# Patient Record
Sex: FEMALE | Race: WHITE | NOT HISPANIC OR LATINO | Employment: FULL TIME | URBAN - METROPOLITAN AREA
[De-identification: names, ages, dates, MRNs, and addresses within clinical notes are randomized per-mention and may not be internally consistent; named-entity substitution may affect disease eponyms.]

---

## 2020-10-03 ENCOUNTER — FLU SHOT (OUTPATIENT)
Dept: FAMILY MEDICINE CLINIC | Facility: CLINIC | Age: 40
End: 2020-10-03

## 2020-10-03 DIAGNOSIS — Z23 NEED FOR INFLUENZA VACCINATION: ICD-10-CM

## 2020-10-03 PROCEDURE — 90471 IMMUNIZATION ADMIN: CPT | Performed by: FAMILY MEDICINE

## 2020-10-03 PROCEDURE — 90686 IIV4 VACC NO PRSV 0.5 ML IM: CPT | Performed by: FAMILY MEDICINE

## 2020-11-17 ENCOUNTER — OFFICE VISIT (OUTPATIENT)
Dept: FAMILY MEDICINE CLINIC | Facility: CLINIC | Age: 40
End: 2020-11-17

## 2020-11-17 VITALS
HEIGHT: 62 IN | WEIGHT: 128 LBS | BODY MASS INDEX: 23.55 KG/M2 | DIASTOLIC BLOOD PRESSURE: 70 MMHG | TEMPERATURE: 97.5 F | OXYGEN SATURATION: 99 % | SYSTOLIC BLOOD PRESSURE: 110 MMHG | HEART RATE: 85 BPM

## 2020-11-17 DIAGNOSIS — Z13.29 SCREENING FOR THYROID DISORDER: ICD-10-CM

## 2020-11-17 DIAGNOSIS — Z13.220 SCREENING FOR LIPID DISORDERS: ICD-10-CM

## 2020-11-17 DIAGNOSIS — Z13.0 SCREENING, ANEMIA, DEFICIENCY, IRON: ICD-10-CM

## 2020-11-17 DIAGNOSIS — Z13.1 SCREENING FOR DIABETES MELLITUS: ICD-10-CM

## 2020-11-17 DIAGNOSIS — Z00.00 HEALTH MAINTENANCE EXAMINATION: Primary | ICD-10-CM

## 2020-11-17 LAB
ALBUMIN SERPL-MCNC: 4.6 G/DL (ref 3.5–5.2)
ALBUMIN/GLOB SERPL: 2.2 G/DL
ALP SERPL-CCNC: 41 U/L (ref 39–117)
ALT SERPL-CCNC: 33 U/L (ref 1–33)
AST SERPL-CCNC: 32 U/L (ref 1–32)
BASOPHILS # BLD AUTO: 0.02 10*3/MM3 (ref 0–0.2)
BASOPHILS NFR BLD AUTO: 0.4 % (ref 0–1.5)
BILIRUB SERPL-MCNC: 0.4 MG/DL (ref 0–1.2)
BUN SERPL-MCNC: 14 MG/DL (ref 6–20)
BUN/CREAT SERPL: 20.9 (ref 7–25)
CALCIUM SERPL-MCNC: 8.8 MG/DL (ref 8.6–10.5)
CHLORIDE SERPL-SCNC: 104 MMOL/L (ref 98–107)
CHOLEST SERPL-MCNC: 150 MG/DL (ref 0–200)
CO2 SERPL-SCNC: 27.8 MMOL/L (ref 22–29)
CREAT SERPL-MCNC: 0.67 MG/DL (ref 0.57–1)
EOSINOPHIL # BLD AUTO: 0.11 10*3/MM3 (ref 0–0.4)
EOSINOPHIL NFR BLD AUTO: 2.1 % (ref 0.3–6.2)
ERYTHROCYTE [DISTWIDTH] IN BLOOD BY AUTOMATED COUNT: 11.8 % (ref 12.3–15.4)
GLOBULIN SER CALC-MCNC: 2.1 GM/DL
GLUCOSE SERPL-MCNC: 85 MG/DL (ref 65–99)
HCT VFR BLD AUTO: 41.1 % (ref 34–46.6)
HDLC SERPL-MCNC: 62 MG/DL (ref 40–60)
HGB BLD-MCNC: 13.6 G/DL (ref 12–15.9)
IMM GRANULOCYTES # BLD AUTO: 0.01 10*3/MM3 (ref 0–0.05)
IMM GRANULOCYTES NFR BLD AUTO: 0.2 % (ref 0–0.5)
LDLC SERPL CALC-MCNC: 75 MG/DL (ref 0–100)
LYMPHOCYTES # BLD AUTO: 1.83 10*3/MM3 (ref 0.7–3.1)
LYMPHOCYTES NFR BLD AUTO: 35.6 % (ref 19.6–45.3)
MCH RBC QN AUTO: 33.3 PG (ref 26.6–33)
MCHC RBC AUTO-ENTMCNC: 33.1 G/DL (ref 31.5–35.7)
MCV RBC AUTO: 100.7 FL (ref 79–97)
MONOCYTES # BLD AUTO: 0.45 10*3/MM3 (ref 0.1–0.9)
MONOCYTES NFR BLD AUTO: 8.8 % (ref 5–12)
NEUTROPHILS # BLD AUTO: 2.72 10*3/MM3 (ref 1.7–7)
NEUTROPHILS NFR BLD AUTO: 52.9 % (ref 42.7–76)
NRBC BLD AUTO-RTO: 0 /100 WBC (ref 0–0.2)
PLATELET # BLD AUTO: 235 10*3/MM3 (ref 140–450)
POTASSIUM SERPL-SCNC: 4.5 MMOL/L (ref 3.5–5.2)
PROT SERPL-MCNC: 6.7 G/DL (ref 6–8.5)
RBC # BLD AUTO: 4.08 10*6/MM3 (ref 3.77–5.28)
SODIUM SERPL-SCNC: 142 MMOL/L (ref 136–145)
TRIGL SERPL-MCNC: 66 MG/DL (ref 0–150)
TSH SERPL DL<=0.005 MIU/L-ACNC: 1.36 UIU/ML (ref 0.27–4.2)
VLDLC SERPL CALC-MCNC: 13 MG/DL (ref 5–40)
WBC # BLD AUTO: 5.14 10*3/MM3 (ref 3.4–10.8)

## 2020-11-17 PROCEDURE — 99396 PREV VISIT EST AGE 40-64: CPT | Performed by: FAMILY MEDICINE

## 2020-11-17 NOTE — PROGRESS NOTES
Magi Sanchez is a 40 y.o. female.     Chief Complaint   Patient presents with   • Establish Care     new pt establishing today with dr reyna    • Annual Exam     no complains        HPI     Pt is a pleasant 40 y.o. YO female here for annual exam.  New patient to me and this office.  Getd some neck tightness and headaches that occurs periodically - occurs monthly.      Annual Exam.      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     The following portions of the patient's history were reviewed and updated as appropriate: allergies, current medications, past family history, past medical history, past social history, past surgical history and problem list.    Review of Systems   Constitutional: Negative for chills, fatigue, fever and unexpected weight change.   HENT: Negative for ear pain, hearing loss, sinus pressure, sore throat and tinnitus.    Eyes: Negative for pain, discharge and redness.   Respiratory: Negative for cough, shortness of breath and wheezing.    Cardiovascular: Negative for chest pain, palpitations and leg swelling.   Gastrointestinal: Negative for abdominal pain, constipation, diarrhea and nausea.   Endocrine: Negative for cold intolerance and heat intolerance.   Genitourinary: Negative for difficulty urinating, flank pain and urgency.   Musculoskeletal: Negative for back pain, joint swelling and myalgias.   Skin: Negative for rash and wound.   Allergic/Immunologic: Negative for environmental allergies and food allergies.   Neurological: Negative for dizziness,  seizures, numbness and headaches.   Hematological: Negative for adenopathy. Does not bruise/bleed easily.   Psychiatric/Behavioral: Negative for decreased concentration, dysphoric mood and sleep disturbance. The patient is not nervous/anxious.    All other systems reviewed and are negative.      Objective  Vitals:    11/17/20 0806   BP: 110/70   Pulse: 85   Temp: 97.5 °F (36.4 °C)   SpO2: 99%   Body mass index is 23.41 kg/m².       Physical Exam  Vitals signs and nursing note reviewed.   Constitutional:       General: She is not in acute distress.     Appearance: She is well-developed.   HENT:      Head: Normocephalic.      Nose: Nose normal.   Cardiovascular:      Rate and Rhythm: Normal rate and regular rhythm.      Heart sounds: Normal heart sounds. No murmur.   Pulmonary:      Effort: Pulmonary effort is normal. No respiratory distress.      Breath sounds: Normal breath sounds.   Musculoskeletal: Normal range of motion.   Skin:     General: Skin is warm and dry.      Findings: No rash.   Neurological:      Mental Status: She is alert and oriented to person, place, and time.   Psychiatric:         Behavior: Behavior normal.         Thought Content: Thought content normal.         Judgment: Judgment normal.           Current Outpatient Medications:   •  LO LOESTRIN FE 1 MG-10 MCG / 10 MCG tablet, , Disp: , Rfl:     Procedures    Lab Results (most recent)     None              Diagnoses and all orders for this visit:    1. Health maintenance examination (Primary)    2. Screening for lipid disorders  -     Lipid Panel    3. Screening for diabetes mellitus  -     Comprehensive Metabolic Panel    4. Screening, anemia, deficiency, iron  -     CBC Auto Differential    5. Screening for thyroid disorder  -     TSH    Here for annual exam, fasting labs ordered as above, immunizations up-to-date, colon cancer screening not indicated, GYN health up-to-date with GYN, cardiovascular screening negative for symptoms, counseled  patient to increase vegetable intake, water and 150 minutes of exercise weekly combining weightbearing exercises and aerobic activity.    Return in about 1 year (around 11/17/2021), or if symptoms worsen or fail to improve, for Annual physical.      Cecilia Oconnor MD    Mask and protective eyewear worn by myself and medical assistant during entire patient encounter.

## 2020-11-30 NOTE — PROGRESS NOTES
Please call patient with results. Kidney and liver functions look normal. Diabetes screen is negative. Cholesterol is normal.  Thyroid function is normal.  No anemia but it does seem that the cells are large which can be a sign of B12 deficiency.  I would recommend taking an over-the-counter vitamin B12 supplement.  We can repeat these next year.     Thank You,    Cecilia Oconnor M.D.

## 2021-12-16 ENCOUNTER — OFFICE VISIT (OUTPATIENT)
Dept: FAMILY MEDICINE CLINIC | Facility: CLINIC | Age: 41
End: 2021-12-16

## 2021-12-16 VITALS
OXYGEN SATURATION: 98 % | HEIGHT: 63 IN | SYSTOLIC BLOOD PRESSURE: 110 MMHG | DIASTOLIC BLOOD PRESSURE: 76 MMHG | TEMPERATURE: 97.7 F | BODY MASS INDEX: 22.86 KG/M2 | HEART RATE: 86 BPM | WEIGHT: 129 LBS

## 2021-12-16 DIAGNOSIS — L24.9 IRRITANT CONTACT DERMATITIS, UNSPECIFIED TRIGGER: Primary | ICD-10-CM

## 2021-12-16 PROCEDURE — 99213 OFFICE O/P EST LOW 20 MIN: CPT | Performed by: NURSE PRACTITIONER

## 2021-12-16 RX ORDER — METHYLPREDNISOLONE 4 MG/1
TABLET ORAL
COMMUNITY
Start: 2021-12-12 | End: 2021-12-20

## 2021-12-16 RX ORDER — TRIAMCINOLONE ACETONIDE 0.25 MG/G
1 OINTMENT TOPICAL 2 TIMES DAILY
Qty: 14 G | Refills: 0 | Status: SHIPPED | OUTPATIENT
Start: 2021-12-16 | End: 2021-12-23

## 2021-12-16 RX ORDER — FENOPROFEN CALCIUM 200 MG
CAPSULE ORAL 2 TIMES DAILY
Qty: 118 ML | Refills: 0 | Status: SHIPPED | OUTPATIENT
Start: 2021-12-16 | End: 2021-12-23

## 2021-12-16 NOTE — PROGRESS NOTES
"Chief Complaint  Rash (12/11 woke up w itchy scalp, 12/12 red itchy rash has spread to chest, abd, legs, forehead. Telemd preascribed medrol pac 12/13, trouble sleeping . Had \"hot honey friday 12/10 and 12/11)    Masks/face shield/appropriate PPE were worn for the entirety of the visit by the patient, MA, and provider.     Subjective          Magi Sanchez presents to [facility]  History of Present Illness  Magi Sanchez is a 41 y.o. female who presents to the clinic today with complaints of an itchy rash.  The rash started on Saturday to her head and progressed behind her ears.  On Sunday, she developed a rash between her legs.  She also developed redness and a rash to her chest.  She did wear tight jeans that could have irritated the skin between her legs.  She tried to use hydrocortisone cream on her legs, but this caused inflammation.  Patient did a telemedicine visit on 12/12/2021 and was prescribed a Medrol Dosepak.  She states the Medrol Dosepak has somewhat helped her symptoms.  She has also been taking Benadryl at night along with an over-the-counter allergy medicine.  She denies starting new medications or new supplements.  She denies new soaps, lotions, or face washes.  She states she has been using her normal Tide detergent.  She did recently switch to a scented Tide, but then switch back as she did not like the smell of the detergent.  She had her COVID-19 booster vaccine on 12/2/2021.  She did have her hair dyed last Wednesday, but denies new hair dyes, shampoos, or products.  She did eat \"hot honey\" on Friday and Saturday night.  She has not been exposed to any outdoor plants or foliage.  She denies cough or difficulty swallowing.  She denies runny nose or congestion.    Review of Systems   Constitutional: Negative.  Negative for fatigue.   HENT: Negative.  Negative for congestion, rhinorrhea and sore throat.    Eyes: Negative.  Negative for pain.   Respiratory: " "Negative.  Negative for cough and shortness of breath.    Cardiovascular: Negative.    Gastrointestinal: Negative.  Negative for diarrhea and vomiting.   Endocrine: Negative.    Genitourinary: Negative.    Musculoskeletal: Negative.    Skin: Positive for rash.   Allergic/Immunologic: Negative.    Neurological: Negative.    Hematological: Negative.    Psychiatric/Behavioral: Negative.         Objective   Vital Signs:   /76   Pulse 86   Temp 97.7 °F (36.5 °C)   Ht 158.8 cm (62.5\")   Wt 58.5 kg (129 lb)   SpO2 98%   BMI 23.22 kg/m²     Physical Exam  Vitals reviewed.   Constitutional:       General: She is not in acute distress.     Appearance: Normal appearance. She is not ill-appearing, toxic-appearing or diaphoretic.   HENT:      Head: Normocephalic and atraumatic.     Cardiovascular:      Rate and Rhythm: Normal rate and regular rhythm.      Heart sounds: Normal heart sounds.   Pulmonary:      Effort: Pulmonary effort is normal. No respiratory distress.      Breath sounds: Normal breath sounds. No wheezing.   Skin:     Findings: Erythema present. Rash is not crusting, papular or scaling.      Comments: Mild erythema to chest   Neurological:      Mental Status: She is alert.      Motor: No weakness.      Gait: Gait normal.   Psychiatric:         Mood and Affect: Mood normal.         Behavior: Behavior normal.        Result Review :                 Assessment and Plan    Diagnoses and all orders for this visit:    1. Irritant contact dermatitis, unspecified trigger (Primary)  -     hydrocortisone 1 % lotion; Apply  topically to the appropriate area as directed 2 (Two) Times a Day for 7 days. Apply to rash on face  Dispense: 118 mL; Refill: 0  -     triamcinolone (KENALOG) 0.025 % ointment; Apply 1 application topically to the appropriate area as directed 2 (Two) Times a Day for 7 days. Apply to rash on body  Dispense: 14 g; Refill: 0      Patient presents today with an erythematous rash to her forehead as " well as to small patches to her posterior neck.  The rash to her inner thighs and chest have started to clear.  She does have mild erythema to her chest wall.  Advised patient to continue Medrol Dosepak.  We also discussed continuing Benadryl and over-the-counter allergy medicine at night.  Patient could be experiencing dermatitis related to hair dye.  She is also unsure if new detergents were used in the towels used at her hairdressers.  Advised patient also eliminate hot honey from her diet as this could have also been the cause.  We discussed using sensitive skin soaps and lotions as well as unscented detergents.  We discussed using unscented lotions like Goldbond or CeraVe to help with the itchiness.    Follow Up   Return if symptoms worsen or fail to improve.  Patient was advised to call next week if symptoms are not improving.  Patient was given instructions and counseling regarding her condition or for health maintenance advice. Please see specific information pulled into the AVS if appropriate.       Answers for HPI/ROS submitted by the patient on 12/15/2021  What is the primary reason for your visit?: Rash    Electronically signed by SARA Kamara, 12/16/21, 8:42 AM EST.

## 2022-01-13 ENCOUNTER — OFFICE VISIT (OUTPATIENT)
Dept: FAMILY MEDICINE CLINIC | Facility: CLINIC | Age: 42
End: 2022-01-13

## 2022-01-13 VITALS
DIASTOLIC BLOOD PRESSURE: 68 MMHG | HEIGHT: 63 IN | HEART RATE: 83 BPM | WEIGHT: 130 LBS | BODY MASS INDEX: 23.04 KG/M2 | OXYGEN SATURATION: 98 % | TEMPERATURE: 97 F | SYSTOLIC BLOOD PRESSURE: 130 MMHG

## 2022-01-13 DIAGNOSIS — Z13.29 SCREENING FOR THYROID DISORDER: ICD-10-CM

## 2022-01-13 DIAGNOSIS — Z13.0 SCREENING FOR DEFICIENCY ANEMIA: ICD-10-CM

## 2022-01-13 DIAGNOSIS — Z83.49 FAMILY HISTORY OF THYROID DISORDER: ICD-10-CM

## 2022-01-13 DIAGNOSIS — R00.2 HEART PALPITATIONS: ICD-10-CM

## 2022-01-13 DIAGNOSIS — Z00.00 ENCOUNTER FOR HEALTH MAINTENANCE EXAMINATION IN ADULT: Primary | ICD-10-CM

## 2022-01-13 DIAGNOSIS — Z13.220 SCREENING FOR LIPID DISORDERS: ICD-10-CM

## 2022-01-13 DIAGNOSIS — Z11.59 NEED FOR HEPATITIS C SCREENING TEST: ICD-10-CM

## 2022-01-13 DIAGNOSIS — Z13.1 SCREENING FOR DIABETES MELLITUS: ICD-10-CM

## 2022-01-13 PROCEDURE — 93000 ELECTROCARDIOGRAM COMPLETE: CPT | Performed by: NURSE PRACTITIONER

## 2022-01-13 PROCEDURE — 99396 PREV VISIT EST AGE 40-64: CPT | Performed by: NURSE PRACTITIONER

## 2022-01-13 PROCEDURE — 99213 OFFICE O/P EST LOW 20 MIN: CPT | Performed by: NURSE PRACTITIONER

## 2022-01-13 NOTE — PROGRESS NOTES
Chief Complaint  Annual Exam (physical - nonfasting )   Masks/face shield/appropriate PPE were worn for the entirety of the visit by the patient, MA, and provider.       Subjective          Magi Sanchez presents to [facility]  History of Present Illness  Magi Sanchez is a 41 y.o. female presents to the clinic today for an annual physical exam.  Patient does not have a significant medical history.      Annual Exam:  · Diet: Patient states she has an overall well-balanced diet.  · Exercise: Patient exercises 5 days a week doing "[employer].     · GYN: Patient currently follows with Dr. Sher at [facility].  Patient states her last Pap smear was in September 2021 was normal.  She states her mammogram was in September 2021 was normal.  Patient does not routinely perform monthly self breast exams.  Patient's OCP is managed by her gynecologist.  · Immunizations: Patient is up-to-date on vaccinations.  · Colon cancer screening: Patient states she has never had a colonoscopy.    · Sleep/mental health: She denies issues with sleep.  She denies feelings of depression or anxiety.  She does have some normal situational anxiety, but it does not impede her daily life.    · Sexual health: Patient is currently sexually active with her .  · Social history: Patient states she has never smoked.  She does drink alcohol occasionally.  · Vision/dental: Patient does not wear contacts or glasses.  She is up-to-date on yearly eye exams.  Patient follows with a dentist routinely.  · Family history: As listed below.    Concerns today: Patient states she does have some chest pressure from time to time over the last month and feels as if she has irregular heartbeats.  Her symptoms are not consistent.  She denies shortness of breath.  Patient states she is able to perform normal physical activity at "[employer] without difficulties.  Patient denies symptoms that are worse when  "lying down at night.  She denies prior cardiac history.  She is wondering if some of her symptoms are related to being worked up over a recent rash as it was undetermined what was causing the rash.  Patient states her rash has resolved now.    Health Maintenance   Topic Date Due   • HEPATITIS C SCREENING  Never done   • PAP SMEAR  07/31/2022   • ANNUAL PHYSICAL  01/14/2023   • TDAP/TD VACCINES (2 - Td or Tdap) 11/13/2024   • COVID-19 Vaccine  Completed   • INFLUENZA VACCINE  Completed   • Pneumococcal Vaccine 0-64  Aged Out     Family History   Problem Relation Age of Onset   • Thyroid disease Mother    • Skin cancer Mother    • Prostate cancer Father         prostate   • Stroke Maternal Grandmother    • Colon cancer Maternal Grandmother 70   • Heart disease Paternal Grandmother         CABG   • Heart attack Paternal Grandmother    • Stroke Maternal Grandfather    • Breast cancer Neg Hx          Review of Systems   Constitutional: Negative.    HENT: Negative.    Eyes: Negative.    Respiratory: Negative.    Cardiovascular: Negative.    Gastrointestinal: Negative.    Endocrine: Negative.    Genitourinary: Negative.    Musculoskeletal: Negative.    Skin: Negative.    Allergic/Immunologic: Negative.    Neurological: Negative.    Hematological: Negative.    Psychiatric/Behavioral: Negative.       Objective   Vital Signs:   /68   Pulse 83   Temp 97 °F (36.1 °C)   Ht 158.8 cm (62.5\")   Wt 59 kg (130 lb)   SpO2 98%   BMI 23.40 kg/m²     Physical Exam  Vitals reviewed.   Constitutional:       General: She is not in acute distress.     Appearance: Normal appearance. She is well-developed. She is not ill-appearing, toxic-appearing or diaphoretic.   HENT:      Head: Normocephalic and atraumatic.      Right Ear: Tympanic membrane, ear canal and external ear normal.      Left Ear: Tympanic membrane, ear canal and external ear normal.   Eyes:      General: No scleral icterus.        Right eye: No discharge.         " Left eye: No discharge.      Extraocular Movements: Extraocular movements intact.   Neck:      Thyroid: No thyroid mass, thyromegaly or thyroid tenderness.   Cardiovascular:      Rate and Rhythm: Normal rate and regular rhythm.      Heart sounds: Normal heart sounds.   Pulmonary:      Effort: Pulmonary effort is normal. No respiratory distress.      Breath sounds: Normal breath sounds. No stridor. No wheezing or rhonchi.   Abdominal:      General: Bowel sounds are normal.      Palpations: Abdomen is soft.   Musculoskeletal:         General: Normal range of motion.      Cervical back: Normal range of motion.      Right lower leg: No edema.      Left lower leg: No edema.   Skin:     General: Skin is warm.   Neurological:      General: No focal deficit present.      Mental Status: She is alert and oriented to person, place, and time.   Psychiatric:         Mood and Affect: Mood normal.         Behavior: Behavior normal.        Result Review :            ECG 12 Lead    Date/Time: 1/13/2022 10:45 AM  Performed by: Melissa Jackson APRN  Authorized by: Melissa Jackson APRN   Comparison: not compared with previous ECG   Rhythm: sinus rhythm  BPM: 79    Clinical impression: normal ECG  Comments: Sinus rhythm              Assessment and Plan    Diagnoses and all orders for this visit:    1. Encounter for health maintenance examination in adult (Primary)    2. Screening for lipid disorders  -     Lipid Panel    3. Screening for diabetes mellitus  -     Comprehensive metabolic panel    4. Screening for deficiency anemia  -     CBC w AUTO Differential    5. Screening for thyroid disorder  -     Thyroid Panel With TSH    6. Family history of thyroid disorder  -     Thyroid Panel With TSH    7. Need for hepatitis C screening test  -     Hepatitis C Antibody    8. Heart palpitations  -     ECG 12 Lead      Patient is a pleasant 41-year-old female.  Patient's blood pressure is slightly elevated today.  She was encouraged to  monitor her blood pressure at home and bring these readings to her next follow-up appointment.  Patient was advised to call our office if blood pressures are consistently greater than 140/80.  I do not feel that patient has diagnosis of hypertension at this time.  EKG showed normal sinus rhythm.  Patient is very active at [employer] and eats a well-balanced diet.  Patient's BMI is also within normal limits.  Patient is up-to-date on routine screening Pap smears as well as mammograms.  Patient will continue to follow-up with her gynecologist for these orders.  We did discuss that colon cancer screening will start at the age of 45 .  Patient is due for routine labs: Lipid panel to screen for lipid disorders, CMP, CBC, and hepatitis C antibody testing.  We will also evaluate patient's thyroid as she does have a family history of thyroid disorder and is complaining of abnormal heart rhythms.    I discussed with patient chest pressure could be related to her recent feelings of anxiety related to her rash, or could be related to strain from [employer].  Patient was encouraged to call the office if symptoms do not improve over the next couple weeks with Tylenol or ibuprofen.  She was advised to go to the ED for any chest pain or shortness of breath.  Patient verbalized understanding.    Follow Up   Return in about 3 months (around 4/13/2022), or if symptoms worsen or fail to improve, for Recheck, elevated blood pressure.  Patient was given instructions and counseling regarding her condition or for health maintenance advice. Please see specific information pulled into the AVS if appropriate.     Electronically signed by SARA Kamara, 01/13/22, 11:07 AM EST.

## 2022-01-14 LAB
ALBUMIN SERPL-MCNC: 4.5 G/DL (ref 3.8–4.8)
ALBUMIN/GLOB SERPL: 2 {RATIO} (ref 1.2–2.2)
ALP SERPL-CCNC: 48 IU/L (ref 44–121)
ALT SERPL-CCNC: 26 IU/L (ref 0–32)
AST SERPL-CCNC: 25 IU/L (ref 0–40)
BASOPHILS # BLD AUTO: 0 X10E3/UL (ref 0–0.2)
BASOPHILS NFR BLD AUTO: 0 %
BILIRUB SERPL-MCNC: 0.4 MG/DL (ref 0–1.2)
BUN SERPL-MCNC: 15 MG/DL (ref 6–24)
BUN/CREAT SERPL: 21 (ref 9–23)
CALCIUM SERPL-MCNC: 9.2 MG/DL (ref 8.7–10.2)
CHLORIDE SERPL-SCNC: 103 MMOL/L (ref 96–106)
CHOLEST SERPL-MCNC: 162 MG/DL (ref 100–199)
CO2 SERPL-SCNC: 25 MMOL/L (ref 20–29)
CREAT SERPL-MCNC: 0.7 MG/DL (ref 0.57–1)
EOSINOPHIL # BLD AUTO: 0.2 X10E3/UL (ref 0–0.4)
EOSINOPHIL NFR BLD AUTO: 3 %
ERYTHROCYTE [DISTWIDTH] IN BLOOD BY AUTOMATED COUNT: 11.7 % (ref 11.7–15.4)
FT4I SERPL CALC-MCNC: 1.8 (ref 1.2–4.9)
GLOBULIN SER CALC-MCNC: 2.3 G/DL (ref 1.5–4.5)
GLUCOSE SERPL-MCNC: 91 MG/DL (ref 65–99)
HCT VFR BLD AUTO: 40.9 % (ref 34–46.6)
HCV AB S/CO SERPL IA: <0.1 S/CO RATIO (ref 0–0.9)
HDLC SERPL-MCNC: 58 MG/DL
HGB BLD-MCNC: 13.7 G/DL (ref 11.1–15.9)
IMM GRANULOCYTES # BLD AUTO: 0 X10E3/UL (ref 0–0.1)
IMM GRANULOCYTES NFR BLD AUTO: 0 %
LDLC SERPL CALC-MCNC: 90 MG/DL (ref 0–99)
LYMPHOCYTES # BLD AUTO: 2.1 X10E3/UL (ref 0.7–3.1)
LYMPHOCYTES NFR BLD AUTO: 36 %
MCH RBC QN AUTO: 34 PG (ref 26.6–33)
MCHC RBC AUTO-ENTMCNC: 33.5 G/DL (ref 31.5–35.7)
MCV RBC AUTO: 102 FL (ref 79–97)
MONOCYTES # BLD AUTO: 0.6 X10E3/UL (ref 0.1–0.9)
MONOCYTES NFR BLD AUTO: 9 %
NEUTROPHILS # BLD AUTO: 3.1 X10E3/UL (ref 1.4–7)
NEUTROPHILS NFR BLD AUTO: 52 %
PLATELET # BLD AUTO: 242 X10E3/UL (ref 150–450)
POTASSIUM SERPL-SCNC: 4.2 MMOL/L (ref 3.5–5.2)
PROT SERPL-MCNC: 6.8 G/DL (ref 6–8.5)
RBC # BLD AUTO: 4.03 X10E6/UL (ref 3.77–5.28)
SODIUM SERPL-SCNC: 142 MMOL/L (ref 134–144)
T3RU NFR SERPL: 23 % (ref 24–39)
T4 SERPL-MCNC: 7.8 UG/DL (ref 4.5–12)
TRIGL SERPL-MCNC: 70 MG/DL (ref 0–149)
TSH SERPL DL<=0.005 MIU/L-ACNC: 1.19 UIU/ML (ref 0.45–4.5)
VLDLC SERPL CALC-MCNC: 14 MG/DL (ref 5–40)
WBC # BLD AUTO: 6 X10E3/UL (ref 3.4–10.8)